# Patient Record
Sex: FEMALE | Race: OTHER | HISPANIC OR LATINO | URBAN - METROPOLITAN AREA
[De-identification: names, ages, dates, MRNs, and addresses within clinical notes are randomized per-mention and may not be internally consistent; named-entity substitution may affect disease eponyms.]

---

## 2023-08-16 ENCOUNTER — EMERGENCY (EMERGENCY)
Facility: HOSPITAL | Age: 63
LOS: 1 days | Discharge: HOME | End: 2023-08-16
Attending: STUDENT IN AN ORGANIZED HEALTH CARE EDUCATION/TRAINING PROGRAM | Admitting: STUDENT IN AN ORGANIZED HEALTH CARE EDUCATION/TRAINING PROGRAM
Payer: MEDICAID

## 2023-08-16 VITALS
DIASTOLIC BLOOD PRESSURE: 85 MMHG | OXYGEN SATURATION: 99 % | HEART RATE: 79 BPM | RESPIRATION RATE: 16 BRPM | SYSTOLIC BLOOD PRESSURE: 143 MMHG | TEMPERATURE: 99 F

## 2023-08-16 PROCEDURE — 99284 EMERGENCY DEPT VISIT MOD MDM: CPT

## 2023-08-16 RX ORDER — DIAZEPAM 5 MG
5 TABLET ORAL ONCE
Refills: 0 | Status: DISCONTINUED | OUTPATIENT
Start: 2023-08-16 | End: 2023-08-16

## 2023-08-16 RX ORDER — KETOROLAC TROMETHAMINE 30 MG/ML
30 SYRINGE (ML) INJECTION ONCE
Refills: 0 | Status: DISCONTINUED | OUTPATIENT
Start: 2023-08-16 | End: 2023-08-16

## 2023-08-16 RX ORDER — ACETAMINOPHEN 500 MG
650 TABLET ORAL ONCE
Refills: 0 | Status: COMPLETED | OUTPATIENT
Start: 2023-08-16 | End: 2023-08-16

## 2023-08-16 RX ORDER — LIDOCAINE 4 G/100G
1 CREAM TOPICAL ONCE
Refills: 0 | Status: COMPLETED | OUTPATIENT
Start: 2023-08-16 | End: 2023-08-16

## 2023-08-16 RX ADMIN — Medication 650 MILLIGRAM(S): at 15:12

## 2023-08-16 RX ADMIN — LIDOCAINE 1 PATCH: 4 CREAM TOPICAL at 15:17

## 2023-08-16 RX ADMIN — Medication 30 MILLIGRAM(S): at 15:16

## 2023-08-16 RX ADMIN — Medication 5 MILLIGRAM(S): at 15:12

## 2023-08-16 NOTE — ED PROVIDER NOTE - PHYSICAL EXAMINATION
Gen: Well appearing in NAD   Head: NC/AT  Neck: trachea midline  Resp:  No distress  Ext: no deformities, right paraspinal low back pain, positive straight leg raise, positive cross straight leg raise.  No saddle anesthesia, able to bear weight and walk.  Neuro:  A&O appears non focal  Skin:  Warm and dry as visualized  Psych:  Normal affect and mood

## 2023-08-16 NOTE — ED ADULT NURSE NOTE - OBJECTIVE STATEMENT
received pt in intake room 10A, 63 yr/o female A+OX4, ambulator at baseline. pt presented to the ED C/O right lower back pain that radiates down buttock and leg. pt has active and passive ROM of all extremities, no obvious joint deformities noted. skin is clean dry and intact. +2 pedal pulses noted. PERRLA and facial symmetry noted. pt denies numbness, tingling, and weakness in peripheral extremities. VSS, denies chest pain and SOB, RR even and unlabored. abdomen is flat, soft, nontender; denies nausea, vomiting, diarrhea, and constipation. pt received meds as ordered and tolerated well. pt is stable.

## 2023-08-16 NOTE — ED ADULT NURSE NOTE - CHIEF COMPLAINT QUOTE
Pt Kyrgy[place] speaking, ID# [id number] presents with rt sided hip pain x 1 wk, reports she is unsure how it started. Denies  symptoms.

## 2023-08-16 NOTE — ED PROVIDER NOTE - CLINICAL SUMMARY MEDICAL DECISION MAKING FREE TEXT BOX
63year-old female no significant past medical history, no daily meds, presents with 1 week right low back pain. Pain worse when moving, radiates to right buttock and posterior thigh.  On exam, right paraspinal low back pain, positive straight leg raise, positive cross straight leg raise.  No saddle anesthesia, able to bear weight and walk.  Differential diagnosis includes, but not limited to: MSK back pain, very low suspicion for cauda equina or cord compression given no red flag symptoms.  Will trial analgesia and diazepam. Reassess for disposition

## 2023-08-16 NOTE — ED PROVIDER NOTE - PROGRESS NOTE DETAILS
Keysha José M.D. [employer] Fellow  Pt reassessed, states back pain much better. Agrees to follow up with PMD/ spine center.

## 2023-08-16 NOTE — ED ADULT TRIAGE NOTE - CHIEF COMPLAINT QUOTE
Pt Romansh speaking, ID# [id number] presents with rt sided hip pain x 1 wk, reports she is unsure how it started. Denies  symptoms.

## 2023-08-16 NOTE — ED ADULT NURSE NOTE - NSFALLUNIVINTERV_ED_ALL_ED
Bed/Stretcher in lowest position, wheels locked, appropriate side rails in place/Call bell, personal items and telephone in reach/Instruct patient to call for assistance before getting out of bed/chair/stretcher/Non-slip footwear applied when patient is off stretcher/[place] to call system/Physically safe environment - no spills, clutter or unnecessary equipment/Purposeful proactive rounding/Room/bathroom lighting operational, light cord in reach Dressing: pressure dressing with telfa

## 2023-08-16 NOTE — ED PROVIDER NOTE - ATTENDING CONTRIBUTION TO CARE
Dr. Pascal, Attending Physician-  I performed a face to face bedside interview with patient regarding history of present illness, review of symptoms and past medical history. I completed an independent physical exam.  I have discussed patient's plan of care with the fellow.    63F, denies pmh, who presents with back pain for the past one week. No trauma. No blood thinners. Intermittent for the past week with radiation down posterior aspect of RLE. No IVDU. No fevers/chills. No bowel/bladder incont. No urine retention. No saddle anesthesia. No back surgeries. Able to ambulate. On ROS, denies headaches, fevers, chills, cough, sputum, cp, sob, abdominal pain, nvd, dysuria, hematuria, recent travel, syncope, black/bloody stools. Physical: Afebrile, well appearing, neck supple, no rash, rrr, ctabl, abdomen soft and ndnt, no le edema, stable gait. SILT over S/S/T/DP/SP b/l. Plan: analgesia, reassess. No back pain red flags on hx/physical.

## 2023-08-16 NOTE — ED PROVIDER NOTE - NSFOLLOWUPINSTRUCTIONS_ED_ALL_ED_FT
** You have back pain  ** You are prescribed Diazepam. Take as directed by your pharmacists. This medication makes you sleepy. Do not drive when you take this medication.   ** Take over the counter Tylenol or Ibuprofen for pain -- follow dosing directions on original bottle.  Salonpas and heat may also help.   ** Follow up with your primary care doctor or spine center in the next 72 hours for physical therapy.      ** Go to the nearest Emergency Department if you experience any new or concerning symptoms, such as:   - worsening pain, unable to move or feel part of your body  - loss of control of bowl or bladder.   - fever, chills ** You have back pain  ** Take over the counter Tylenol or Ibuprofen for pain -- follow dosing directions on original bottle.  Salonpas and heat may also help.   ** Follow up with your primary care doctor or spine center in the next 72 hours for physical therapy.      ** Go to the nearest Emergency Department if you experience any new or concerning symptoms, such as:   - worsening pain, unable to move or feel part of your body  - loss of control of bowl or bladder.   - fever, chills

## 2023-08-16 NOTE — ED PROVIDER NOTE - PATIENT PORTAL LINK FT
You can access the FollowMyHealth Patient Portal offered by [facility] by registering at the following website: [email or web address]. By joining [facility]’s FollowMyHealth portal, you will also be able to view your health information using other applications (apps) compatible with our system.

## 2023-08-16 NOTE — ED PROVIDER NOTE - OBJECTIVE STATEMENT
An  [id number]  63year-old female no significant past medical history, no daily meds, presents with 1 week right low back pain.  Pain worse when moving, radiates to right buttock and posterior thigh.  Denies fevers chills, no falls, no incontinence of bladder or bowel, no falls, no new instrumentation to back, no midline tenderness, no saddle anesthesia.

## 2025-09-14 ENCOUNTER — EMERGENCY (EMERGENCY)
Facility: HOSPITAL | Age: 65
LOS: 1 days | End: 2025-09-14
Admitting: EMERGENCY MEDICINE
Payer: SELF-PAY

## 2025-09-14 VITALS
DIASTOLIC BLOOD PRESSURE: 87 MMHG | HEART RATE: 91 BPM | WEIGHT: 158.07 LBS | SYSTOLIC BLOOD PRESSURE: 177 MMHG | TEMPERATURE: 99 F | OXYGEN SATURATION: 97 % | RESPIRATION RATE: 18 BRPM

## 2025-09-14 LAB
ALBUMIN SERPL ELPH-MCNC: 3.5 G/DL — SIGNIFICANT CHANGE UP (ref 3.3–5)
ALP SERPL-CCNC: 149 U/L — HIGH (ref 40–120)
ALT FLD-CCNC: 21 U/L — SIGNIFICANT CHANGE UP (ref 4–33)
ANION GAP SERPL CALC-SCNC: 14 MMOL/L — SIGNIFICANT CHANGE UP (ref 7–14)
AST SERPL-CCNC: 30 U/L — SIGNIFICANT CHANGE UP (ref 4–32)
BASOPHILS # BLD AUTO: 0.02 K/UL — SIGNIFICANT CHANGE UP (ref 0–0.2)
BASOPHILS NFR BLD AUTO: 0.2 % — SIGNIFICANT CHANGE UP (ref 0–2)
BILIRUB SERPL-MCNC: <0.2 MG/DL — SIGNIFICANT CHANGE UP (ref 0.2–1.2)
BUN SERPL-MCNC: 15 MG/DL — SIGNIFICANT CHANGE UP (ref 7–23)
CALCIUM SERPL-MCNC: 8.7 MG/DL — SIGNIFICANT CHANGE UP (ref 8.4–10.5)
CHLORIDE SERPL-SCNC: 105 MMOL/L — SIGNIFICANT CHANGE UP (ref 98–107)
CO2 SERPL-SCNC: 20 MMOL/L — LOW (ref 22–31)
CREAT SERPL-MCNC: 0.59 MG/DL — SIGNIFICANT CHANGE UP (ref 0.5–1.3)
EGFR: 101 ML/MIN/1.73M2 — SIGNIFICANT CHANGE UP
EGFR: 101 ML/MIN/1.73M2 — SIGNIFICANT CHANGE UP
EOSINOPHIL # BLD AUTO: 0.06 K/UL — SIGNIFICANT CHANGE UP (ref 0–0.5)
EOSINOPHIL NFR BLD AUTO: 0.5 % — SIGNIFICANT CHANGE UP (ref 0–6)
GLUCOSE SERPL-MCNC: 106 MG/DL — HIGH (ref 70–99)
HCT VFR BLD CALC: 38.3 % — SIGNIFICANT CHANGE UP (ref 34.5–45)
HGB BLD-MCNC: 13 G/DL — SIGNIFICANT CHANGE UP (ref 11.5–15.5)
IMM GRANULOCYTES # BLD AUTO: 0.07 K/UL — SIGNIFICANT CHANGE UP (ref 0–0.07)
IMM GRANULOCYTES NFR BLD AUTO: 0.6 % — SIGNIFICANT CHANGE UP (ref 0–0.9)
LYMPHOCYTES # BLD AUTO: 1.83 K/UL — SIGNIFICANT CHANGE UP (ref 1–3.3)
LYMPHOCYTES NFR BLD AUTO: 16 % — SIGNIFICANT CHANGE UP (ref 13–44)
MCHC RBC-ENTMCNC: 31.3 PG — SIGNIFICANT CHANGE UP (ref 27–34)
MCHC RBC-ENTMCNC: 33.9 G/DL — SIGNIFICANT CHANGE UP (ref 32–36)
MCV RBC AUTO: 92.3 FL — SIGNIFICANT CHANGE UP (ref 80–100)
MONOCYTES # BLD AUTO: 0.78 K/UL — SIGNIFICANT CHANGE UP (ref 0–0.9)
MONOCYTES NFR BLD AUTO: 6.8 % — SIGNIFICANT CHANGE UP (ref 2–14)
NEUTROPHILS # BLD AUTO: 8.69 K/UL — HIGH (ref 1.8–7.4)
NEUTROPHILS NFR BLD AUTO: 75.9 % — SIGNIFICANT CHANGE UP (ref 43–77)
NRBC # BLD AUTO: 0 K/UL — SIGNIFICANT CHANGE UP (ref 0–0)
NRBC # FLD: 0 K/UL — SIGNIFICANT CHANGE UP (ref 0–0)
NRBC BLD AUTO-RTO: 0 /100 WBCS — SIGNIFICANT CHANGE UP (ref 0–0)
PLATELET # BLD AUTO: 254 K/UL — SIGNIFICANT CHANGE UP (ref 150–400)
PMV BLD: 10 FL — SIGNIFICANT CHANGE UP (ref 7–13)
POTASSIUM SERPL-MCNC: 3.9 MMOL/L — SIGNIFICANT CHANGE UP (ref 3.5–5.3)
POTASSIUM SERPL-SCNC: 3.9 MMOL/L — SIGNIFICANT CHANGE UP (ref 3.5–5.3)
PROT SERPL-MCNC: 6.4 G/DL — SIGNIFICANT CHANGE UP (ref 6–8.3)
RBC # BLD: 4.15 M/UL — SIGNIFICANT CHANGE UP (ref 3.8–5.2)
RBC # FLD: 11.9 % — SIGNIFICANT CHANGE UP (ref 10.3–14.5)
SODIUM SERPL-SCNC: 139 MMOL/L — SIGNIFICANT CHANGE UP (ref 135–145)
WBC # BLD: 11.45 K/UL — HIGH (ref 3.8–10.5)
WBC # FLD AUTO: 11.45 K/UL — HIGH (ref 3.8–10.5)

## 2025-09-14 PROCEDURE — 93010 ELECTROCARDIOGRAM REPORT: CPT

## 2025-09-14 PROCEDURE — 99284 EMERGENCY DEPT VISIT MOD MDM: CPT

## 2025-09-14 RX ORDER — METOCLOPRAMIDE HCL 10 MG
10 TABLET ORAL ONCE
Refills: 0 | Status: COMPLETED | OUTPATIENT
Start: 2025-09-14 | End: 2025-09-14

## 2025-09-14 RX ORDER — AMLODIPINE BESYLATE 10 MG/1
10 TABLET ORAL ONCE
Refills: 0 | Status: COMPLETED | OUTPATIENT
Start: 2025-09-14 | End: 2025-09-14

## 2025-09-14 RX ORDER — ACETAMINOPHEN 500 MG/5ML
1000 LIQUID (ML) ORAL ONCE
Refills: 0 | Status: COMPLETED | OUTPATIENT
Start: 2025-09-14 | End: 2025-09-14

## 2025-09-14 RX ADMIN — Medication 400 MILLIGRAM(S): at 22:20

## 2025-09-14 RX ADMIN — AMLODIPINE BESYLATE 10 MILLIGRAM(S): 10 TABLET ORAL at 22:20

## 2025-09-14 RX ADMIN — Medication 10 MILLIGRAM(S): at 22:20

## 2025-09-15 VITALS
RESPIRATION RATE: 18 BRPM | SYSTOLIC BLOOD PRESSURE: 133 MMHG | TEMPERATURE: 97 F | DIASTOLIC BLOOD PRESSURE: 73 MMHG | OXYGEN SATURATION: 97 % | HEART RATE: 85 BPM

## 2025-09-15 PROCEDURE — 70450 CT HEAD/BRAIN W/O DYE: CPT | Mod: 26

## 2025-09-15 RX ORDER — AMOXICILLIN AND CLAVULANATE POTASSIUM 500; 125 MG/1; MG/1
1 TABLET, FILM COATED ORAL
Qty: 20 | Refills: 0
Start: 2025-09-15 | End: 2025-09-24

## 2025-09-15 RX ORDER — AMLODIPINE BESYLATE 10 MG/1
2 TABLET ORAL
Qty: 60 | Refills: 0
Start: 2025-09-15 | End: 2025-10-14